# Patient Record
Sex: MALE | Race: WHITE
[De-identification: names, ages, dates, MRNs, and addresses within clinical notes are randomized per-mention and may not be internally consistent; named-entity substitution may affect disease eponyms.]

---

## 2017-01-18 ENCOUNTER — HOSPITAL ENCOUNTER (OUTPATIENT)
Dept: HOSPITAL 47 - LABWHC1 | Age: 67
Discharge: HOME | End: 2017-01-18
Payer: MEDICARE

## 2017-01-18 DIAGNOSIS — M46.20: Primary | ICD-10-CM

## 2017-01-18 LAB
ANION GAP SERPL CALC-SCNC: 13 MMOL/L
BASOPHILS # BLD AUTO: 0 K/UL (ref 0–0.2)
BASOPHILS NFR BLD AUTO: 1 %
BUN SERPL-SCNC: 17 MG/DL (ref 9–20)
CALCIUM SPEC-MCNC: 9.6 MG/DL (ref 8.4–10.2)
CH: 30.1
CHCM: 31.8
CHLORIDE SERPL-SCNC: 100 MMOL/L (ref 98–107)
CO2 SERPL-SCNC: 26 MMOL/L (ref 22–30)
EOSINOPHIL # BLD AUTO: 0.2 K/UL (ref 0–0.7)
EOSINOPHIL NFR BLD AUTO: 3 %
ERYTHROCYTE [DISTWIDTH] IN BLOOD BY AUTOMATED COUNT: 3.84 M/UL (ref 4.3–5.9)
ERYTHROCYTE [DISTWIDTH] IN BLOOD: 13.3 % (ref 11.5–15.5)
GLUCOSE SERPL-MCNC: 221 MG/DL (ref 74–99)
HCT VFR BLD AUTO: 36.5 % (ref 39–53)
HDW: 2.07
HGB BLD-MCNC: 11.9 GM/DL (ref 13–17.5)
LUC NFR BLD AUTO: 2 %
LYMPHOCYTES # SPEC AUTO: 2.1 K/UL (ref 1–4.8)
LYMPHOCYTES NFR SPEC AUTO: 39 %
MCH RBC QN AUTO: 30.8 PG (ref 25–35)
MCHC RBC AUTO-ENTMCNC: 32.5 G/DL (ref 31–37)
MCV RBC AUTO: 94.9 FL (ref 80–100)
MONOCYTES # BLD AUTO: 0.4 K/UL (ref 0–1)
MONOCYTES NFR BLD AUTO: 6 %
NEUTROPHILS # BLD AUTO: 2.6 K/UL (ref 1.3–7.7)
NEUTROPHILS NFR BLD AUTO: 49 %
NON-AFRICAN AMERICAN GFR(MDRD): >60
POTASSIUM SERPL-SCNC: 5.1 MMOL/L (ref 3.5–5.1)
SODIUM SERPL-SCNC: 139 MMOL/L (ref 137–145)
WBC # BLD AUTO: 0.11 10*3/UL
WBC # BLD AUTO: 5.4 K/UL (ref 3.8–10.6)
WBC (PEROX): 5.88

## 2017-01-18 PROCEDURE — 85025 COMPLETE CBC W/AUTO DIFF WBC: CPT

## 2017-01-18 PROCEDURE — 36415 COLL VENOUS BLD VENIPUNCTURE: CPT

## 2017-01-18 PROCEDURE — 80048 BASIC METABOLIC PNL TOTAL CA: CPT

## 2017-01-18 PROCEDURE — 86140 C-REACTIVE PROTEIN: CPT

## 2018-05-14 ENCOUNTER — HOSPITAL ENCOUNTER (OUTPATIENT)
Dept: HOSPITAL 47 - RADMRIMAIN | Age: 68
Discharge: HOME | End: 2018-05-14
Attending: FAMILY MEDICINE
Payer: MEDICARE

## 2018-05-14 DIAGNOSIS — M54.2: Primary | ICD-10-CM

## 2018-05-14 DIAGNOSIS — M40.209: ICD-10-CM

## 2018-05-14 PROCEDURE — 82565 ASSAY OF CREATININE: CPT

## 2018-06-18 ENCOUNTER — HOSPITAL ENCOUNTER (INPATIENT)
Dept: HOSPITAL 47 - EC | Age: 68
LOS: 3 days | Discharge: NURSING, REHAB OR LONG-TERM CARE | DRG: 193 | End: 2018-06-21
Attending: FAMILY MEDICINE | Admitting: FAMILY MEDICINE
Payer: MEDICARE

## 2018-06-18 VITALS — BODY MASS INDEX: 23.8 KG/M2

## 2018-06-18 DIAGNOSIS — J45.909: ICD-10-CM

## 2018-06-18 DIAGNOSIS — Z98.1: ICD-10-CM

## 2018-06-18 DIAGNOSIS — E11.9: ICD-10-CM

## 2018-06-18 DIAGNOSIS — Z79.899: ICD-10-CM

## 2018-06-18 DIAGNOSIS — I10: ICD-10-CM

## 2018-06-18 DIAGNOSIS — K82.8: ICD-10-CM

## 2018-06-18 DIAGNOSIS — Z79.4: ICD-10-CM

## 2018-06-18 DIAGNOSIS — R74.0: ICD-10-CM

## 2018-06-18 DIAGNOSIS — R79.1: ICD-10-CM

## 2018-06-18 DIAGNOSIS — M40.50: ICD-10-CM

## 2018-06-18 DIAGNOSIS — G93.40: ICD-10-CM

## 2018-06-18 DIAGNOSIS — J18.9: Primary | ICD-10-CM

## 2018-06-18 DIAGNOSIS — M19.90: ICD-10-CM

## 2018-06-18 DIAGNOSIS — Z87.891: ICD-10-CM

## 2018-06-18 DIAGNOSIS — Z79.51: ICD-10-CM
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
## 2018-06-18 LAB
ALBUMIN SERPL-MCNC: 3 G/DL (ref 3.5–5)
ALP SERPL-CCNC: 466 U/L (ref 38–126)
ALT SERPL-CCNC: 113 U/L (ref 21–72)
ANION GAP SERPL CALC-SCNC: 11 MMOL/L
APTT BLD: 21 SEC (ref 22–30)
AST SERPL-CCNC: 70 U/L (ref 17–59)
BASOPHILS # BLD AUTO: 0 K/UL (ref 0–0.2)
BASOPHILS NFR BLD AUTO: 0 %
BUN SERPL-SCNC: 33 MG/DL (ref 9–20)
CALCIUM SPEC-MCNC: 9.1 MG/DL (ref 8.4–10.2)
CHLORIDE SERPL-SCNC: 102 MMOL/L (ref 98–107)
CK SERPL-CCNC: 77 U/L (ref 55–170)
CO2 SERPL-SCNC: 25 MMOL/L (ref 22–30)
EOSINOPHIL # BLD AUTO: 0 K/UL (ref 0–0.7)
EOSINOPHIL NFR BLD AUTO: 0 %
ERYTHROCYTE [DISTWIDTH] IN BLOOD BY AUTOMATED COUNT: 3.47 M/UL (ref 4.3–5.9)
ERYTHROCYTE [DISTWIDTH] IN BLOOD: 13.7 % (ref 11.5–15.5)
GLUCOSE BLD-MCNC: 301 MG/DL (ref 75–99)
GLUCOSE SERPL-MCNC: 348 MG/DL (ref 74–99)
GLUCOSE UR QL: (no result)
HCT VFR BLD AUTO: 32.5 % (ref 39–53)
HGB BLD-MCNC: 10.6 GM/DL (ref 13–17.5)
INR PPP: 0.9 (ref ?–1.2)
LYMPHOCYTES # SPEC AUTO: 0.5 K/UL (ref 1–4.8)
LYMPHOCYTES NFR SPEC AUTO: 6 %
MCH RBC QN AUTO: 30.7 PG (ref 25–35)
MCHC RBC AUTO-ENTMCNC: 32.7 G/DL (ref 31–37)
MCV RBC AUTO: 93.8 FL (ref 80–100)
MONOCYTES # BLD AUTO: 0.4 K/UL (ref 0–1)
MONOCYTES NFR BLD AUTO: 5 %
NEUTROPHILS # BLD AUTO: 7.3 K/UL (ref 1.3–7.7)
NEUTROPHILS NFR BLD AUTO: 88 %
PH UR: 5.5 [PH] (ref 5–8)
PLATELET # BLD AUTO: 400 K/UL (ref 150–450)
POTASSIUM SERPL-SCNC: 4.8 MMOL/L (ref 3.5–5.1)
PROT SERPL-MCNC: 5.4 G/DL (ref 6.3–8.2)
PROT UR QL: (no result)
PT BLD: 9.3 SEC (ref 9–12)
RBC UR QL: 2 /HPF (ref 0–5)
SODIUM SERPL-SCNC: 138 MMOL/L (ref 137–145)
SP GR UR: 1.02 (ref 1–1.03)
TROPONIN I SERPL-MCNC: <0.012 NG/ML (ref 0–0.03)
UROBILINOGEN UR QL STRIP: <2 MG/DL (ref ?–2)
WBC # BLD AUTO: 8.3 K/UL (ref 3.8–10.6)
WBC #/AREA URNS HPF: 1 /HPF (ref 0–5)

## 2018-06-18 PROCEDURE — 83605 ASSAY OF LACTIC ACID: CPT

## 2018-06-18 PROCEDURE — 36415 COLL VENOUS BLD VENIPUNCTURE: CPT

## 2018-06-18 PROCEDURE — 96365 THER/PROPH/DIAG IV INF INIT: CPT

## 2018-06-18 PROCEDURE — 83036 HEMOGLOBIN GLYCOSYLATED A1C: CPT

## 2018-06-18 PROCEDURE — 81001 URINALYSIS AUTO W/SCOPE: CPT

## 2018-06-18 PROCEDURE — 84484 ASSAY OF TROPONIN QUANT: CPT

## 2018-06-18 PROCEDURE — 94760 N-INVAS EAR/PLS OXIMETRY 1: CPT

## 2018-06-18 PROCEDURE — 87086 URINE CULTURE/COLONY COUNT: CPT

## 2018-06-18 PROCEDURE — 96368 THER/DIAG CONCURRENT INF: CPT

## 2018-06-18 PROCEDURE — 85610 PROTHROMBIN TIME: CPT

## 2018-06-18 PROCEDURE — 76705 ECHO EXAM OF ABDOMEN: CPT

## 2018-06-18 PROCEDURE — 85730 THROMBOPLASTIN TIME PARTIAL: CPT

## 2018-06-18 PROCEDURE — 85025 COMPLETE CBC W/AUTO DIFF WBC: CPT

## 2018-06-18 PROCEDURE — 71275 CT ANGIOGRAPHY CHEST: CPT

## 2018-06-18 PROCEDURE — 80202 ASSAY OF VANCOMYCIN: CPT

## 2018-06-18 PROCEDURE — 85027 COMPLETE CBC AUTOMATED: CPT

## 2018-06-18 PROCEDURE — 93005 ELECTROCARDIOGRAM TRACING: CPT

## 2018-06-18 PROCEDURE — 70450 CT HEAD/BRAIN W/O DYE: CPT

## 2018-06-18 PROCEDURE — 80048 BASIC METABOLIC PNL TOTAL CA: CPT

## 2018-06-18 PROCEDURE — 93970 EXTREMITY STUDY: CPT

## 2018-06-18 PROCEDURE — 80053 COMPREHEN METABOLIC PANEL: CPT

## 2018-06-18 PROCEDURE — 85379 FIBRIN DEGRADATION QUANT: CPT

## 2018-06-18 PROCEDURE — 80076 HEPATIC FUNCTION PANEL: CPT

## 2018-06-18 PROCEDURE — 94640 AIRWAY INHALATION TREATMENT: CPT

## 2018-06-18 PROCEDURE — 71045 X-RAY EXAM CHEST 1 VIEW: CPT

## 2018-06-18 PROCEDURE — 82553 CREATINE MB FRACTION: CPT

## 2018-06-18 PROCEDURE — 82550 ASSAY OF CK (CPK): CPT

## 2018-06-18 PROCEDURE — 99285 EMERGENCY DEPT VISIT HI MDM: CPT

## 2018-06-18 PROCEDURE — 87040 BLOOD CULTURE FOR BACTERIA: CPT

## 2018-06-18 RX ADMIN — GABAPENTIN SCH MG: 300 CAPSULE ORAL at 22:51

## 2018-06-18 RX ADMIN — NICARDIPINE HYDROCHLORIDE SCH MLS/HR: 2.5 INJECTION INTRAVENOUS at 20:59

## 2018-06-18 RX ADMIN — MEGESTROL ACETATE SCH MG: 40 SUSPENSION ORAL at 22:51

## 2018-06-18 RX ADMIN — NICARDIPINE HYDROCHLORIDE SCH MLS/HR: 2.5 INJECTION INTRAVENOUS at 19:47

## 2018-06-18 RX ADMIN — METOCLOPRAMIDE SCH MG: 10 TABLET ORAL at 22:51

## 2018-06-18 NOTE — XR
EXAMINATION TYPE: XR chest 1V portable

 

DATE OF EXAM: 6/18/2018

 

COMPARISON: 12/30/2014

 

HISTORY: Fever

 

TECHNIQUE: Single frontal view of the chest is obtained.

 

FINDINGS:  Heart size is normal. There is no heart failure. There is old posterior right side healed 
rib fractures. There is a minimal infiltrate in the lateral left lower lobe. There is no pleural effu
tawana. Thoracic aorta is atheromatous.

 

IMPRESSION:  There is new mild infiltrate in the lateral left lower lobe compared to old exam. Normal
 heart.

## 2018-06-18 NOTE — US
EXAMINATION TYPE: US abdomen limited

 

DATE OF EXAM: 6/18/2018

 

COMPARISON: NONE

 

CLINICAL HISTORY: Transaminitis. Abnormal labs. 

 

EXAM MEASUREMENTS:

 

Liver Length:  15.2 cm   

Gallbladder Wall:  0.2 cm   

CBD:  0.8 cm

CHD: 1.0 cm

Right Kidney:  10.9 x 4.8 x 4.1 cm

 

 

 

Pancreas:  Echogenic.  Main pancreatic duct = 2.6 mm. Tail obscured by overlying bowel gas

Liver:  Limited exam due to patient position.  Scanned through ribs.  Portions seen appear within nor
mal limits.    

Gallbladder:  Appears enlarged.  Fold seen.  Fundal not entirely visualized due to bowel gas. 

**Evidence for sonographic Flores's sign:  neg

CBD:  Dilated

CHD: Dilated 

Right Kidney:  wnl as visualized 

 

****Limited exam due to overlying bowel gas, patient unable to lay down due to spine and turn LLD. 

 

IMPRESSION: Gallbladder is dilated and measures 13 x 5 cm. This is consistent with cholecystitis. Int
rahepatic bile ducts do not appear dilated.

## 2018-06-18 NOTE — CT
EXAMINATION TYPE: CT angio chest

 

DATE OF EXAM: 6/18/2018 8:19 PM

 

COMPARISON: NONE

 

HISTORY: Elevated D-dimer

 

CT DLP: 316 mGycm

Automated exposure control for dose reduction was used.

 

CONTRAST: 

CTA scan of the thorax is performed with IV Contrast, patient injected with 70ml mL of Isovue 370, pu
lmonary embolism protocol.  There are 3-D post processed images..  

 

FINDINGS:

 

There is some patchy interstitial infiltrate in the left upper lobe and right upper lobe. This is wor
se on the left side. There is some patchy consolidation in the lingula left upper lobe. There is mirella
lar more mild interstitial groundglass infiltrate in the lower lobes. There is no pleural effusion. H
eart size is normal. There is no pericardial effusion.

 

I see no filling defects in the pulmonary arteries. There are no hilar masses. There is no evidence o
f thoracic aortic aneurysm or dissection. There is multilevel lower thoracic posterior spine fusion s
urgery. I see no bony destructive process. There is old appearing compression fracture of L1.

 

 

IMPRESSION: 

NO EVIDENCE OF PULMONARY EMBOLISM.

 

BILATERAL INTERSTITIAL GROUNDGLASS INFILTRATES CONSISTENT WITH PULMONARY FIBROSIS. THERE IS AIRSPACE 
INFILTRATE IN THE LEFT UPPER LOBE MAINLY IN THE LINGULA CONSISTENT WITH BRONCHOPNEUMONIA.

## 2018-06-18 NOTE — ED
Altered Mental Status HPI





- General


Chief Complaint: Altered Mental Status


Stated Complaint: abn labs


Time Seen by Provider: 06/18/18 17:55


Source: patient, EMS


Mode of arrival: EMS


Limitations: no limitations





- History of Present Illness


Initial Comments: 





This is a 68-year-old male who presents emergency department for mental status 

changes over the last day or so.  The family states is been hallucinating and 

having some confused speech.  He is also noted to have a fever today as high as 

101 at the nursing facility.  The patient was recently released from the 

hospital after having a neck surgery.  He was using an incentive spirometer at 

the nursing facility however then stopped using it.  He has developed a little 

bit of a cough however he states this is chronic from his asthma.  The patient 

denies any chest pain.  No nausea, vomiting, or diarrhea.  No abdominal pain.  

Family states that he had blood work performed at the nursing facility that 

showed an elevated d-dimer and thus he was sent to the emergency department for 

further evaluation.  No other acute complaints.





- Related Data


 Home Medications











 Medication  Instructions  Recorded  Confirmed


 


Gabapentin [Neurontin] 300 mg PO TID 10/05/14 06/18/18


 


Omeprazole [PriLOSEC] 20 mg PO AC-BRKFST PRN 10/05/14 06/18/18


 


metFORMIN HCL [Glucophage] 1,000 mg PO AC-BID 10/05/14 06/18/18


 


Amoxic-Pot Clav 875-125Mg 1 tab PO Q12HR 06/18/18 06/18/18





[Augmentin 875-125]   


 


Atorvastatin [Lipitor] 40 mg PO HS 06/18/18 06/18/18


 


Bisacodyl [Dulcolax] 10 mg RECTAL DAILY PRN 06/18/18 06/18/18


 


Budesonide/Formoterol Fumarate 2 puff INHALATION BID 06/18/18 06/18/18





[Symbicort 160-4.5 Mcg Inhaler]   


 


Fluticasone/Vilanterol [Breo 1 inhalation PO Q24HR 06/18/18 06/18/18





Ellipta 100-25 Mcg Inhaler]   


 


Hydrochlorothiazide [Hydrodiuril] 12.5 mg PO DAILY 06/18/18 06/18/18


 


Insulin Aspart [NovoLOG See Protocol SQ AC-TID 06/18/18 06/18/18





(formulary)]   


 


Insulin Detemir [Levemir] 15 unit SQ DAILY 06/18/18 06/18/18


 


Ipratropium Nebulized [Atrovent 0.5 mg INHALATION RT-TID 06/18/18 06/18/18





Nebulized]   


 


Lactulose 20 gm PO BID PRN 06/18/18 06/18/18


 


Magnesium Hydroxide [Milk of 7,200 mg PO AS DIRECTED PRN 06/18/18 06/18/18





Magnesia Concentrate]   


 


Megestrol Acetate [Megace] 200 mg PO TID 06/18/18 06/18/18


 


Methocarbamol [Robaxin] 500 mg PO Q8H PRN 06/18/18 06/18/18


 


Metoclopramide HCl [Reglan] 10 mg PO QID 06/18/18 06/18/18


 


Metoprolol Tartrate [Lopressor] 25 mg PO BID 06/18/18 06/18/18


 


Montelukast [Singulair] 10 mg PO DAILY 06/18/18 06/18/18


 


Na Phos,M-B/Na Phos,Di-Ba [Fleet 133 ml RECTAL ONCE PRN 06/18/18 06/18/18





Adult]   


 


Vits A and D/White Pet/Lanolin [A 1 applic TOPICAL DAILY PRN 06/18/18 06/18/18





and D Ointment]   


 


oxyCODONE HCL [oxyCODONE HCL ER] 40 mg PO Q12HR 06/18/18 06/18/18


 


oxyCODONE-APAP 5-325MG [Percocet 2 tab PO Q4HR PRN 06/18/18 06/18/18





5-325 mg]   











 Allergies











Allergy/AdvReac Type Severity Reaction Status Date / Time


 


diazepam [From Valium] Allergy  Hallucinati Verified 06/18/18 18:28





   ons  


 


morphine Allergy  Rapid Verified 06/18/18 18:28





   Heart Rate  














Review of Systems


ROS Statement: 


Those systems with pertinent positive or pertinent negative responses have been 

documented in the HPI.





ROS Other: All systems not noted in ROS Statement are negative.





Past Medical History


Past Medical History: Asthma, Diabetes Mellitus, Hypertension


Additional Past Medical History / Comment(s): back problems, osteomyelitis in 

his back 2014


History of Any Multi-Drug Resistant Organisms: None Reported


Past Surgical History: Orthopedic Surgery, Tonsillectomy


Additional Past Surgical History / Comment(s): multiple back surgeries, 

cervical fusion, lumbar repair


Past Anesthesia/Blood Transfusion Reactions: No Reported Reaction


Past Psychological History: No Psychological Hx Reported


Smoking Status: Former smoker


Past Alcohol Use History: None Reported


Past Drug Use History: None Reported





General Exam





- General Exam Comments


Initial Comments: 





Constitutional: Awake alert Appears comfortable


Head: Normocephalic atraumatic 


Eyes: no conjunctival injection No scleral icterus EOMI


Neck: No JVD Supple, aspen collar in place


Heart: Regular rate rhythm normal S1-S2 no murmurs


Lungs: Clear to auscultation bilaterally No wheezing No rales, decreased breath 

sounds on the right side


Abdomen: Soft nondistended nontender


Extremities: Non edematous DP pulses intact Radial pulses intact


Neuro: Awake and alert.  Oriented 2.  Does not know the year or the season No 

focal neurologic deficits


Psych: Appropriate mood and affect





Limitations: no limitations





Course


 Vital Signs











  06/18/18 06/18/18 06/18/18





  17:46 19:51 21:03


 


Temperature 99.9 F H  97.9 F


 


Pulse Rate 84 84 73


 


Respiratory 20 18 18





Rate   


 


Blood Pressure 118/55 148/70 150/73


 


O2 Sat by Pulse 95 98 97





Oximetry   














- Reevaluation(s)


Reevaluation #1: 





06/18/18 20:47


EKG showing normal sinus rhythm with a rate of 75.  There is no known last 7 

changes or T-wave inversions.  QTC is 406.  Other intervals normal.  No ectopy.





Medical Decision Making





- Medical Decision Making





Is a 68-year-old male who presents emergency department for mental status 

changes.  He is found to have a left-sided pneumonia.  Started on vancomycin 

and cefepime to cover for H CAPD.  The patient was awake and alert throughout 

the entire emergency department stay.  Vital signs were stable.  The patient 

also was noted to have some transaminitis and elevation in his alk phos.  The 

patient had an ultrasound performed that showed gallbladder wall dilation that 

could be consistent with cholecystitis.  The patient is absolutely no abdominal 

pain.  I spoke with Dr. Manning about this and he stated that it could be chronic 

however he does not feel that this is anything acute in nature that it does not 

require any emergent intervention.  I spoke with Dr. Olivo who accepts the 

patient for admission.  Dr. Manning was placed on consult.





- Lab Data


Result diagrams: 


 06/18/18 18:15





 06/18/18 18:15


 Lab Results











  06/18/18 06/18/18 06/18/18 Range/Units





  18:15 18:15 18:15 


 


WBC   8.3   (3.8-10.6)  k/uL


 


RBC   3.47 L   (4.30-5.90)  m/uL


 


Hgb   10.6 L   (13.0-17.5)  gm/dL


 


Hct   32.5 L   (39.0-53.0)  %


 


MCV   93.8   (80.0-100.0)  fL


 


MCH   30.7   (25.0-35.0)  pg


 


MCHC   32.7   (31.0-37.0)  g/dL


 


RDW   13.7   (11.5-15.5)  %


 


Plt Count   400   (150-450)  k/uL


 


Neutrophils %   88   %


 


Lymphocytes %   6   %


 


Monocytes %   5   %


 


Eosinophils %   0   %


 


Basophils %   0   %


 


Neutrophils #   7.3   (1.3-7.7)  k/uL


 


Lymphocytes #   0.5 L   (1.0-4.8)  k/uL


 


Monocytes #   0.4   (0-1.0)  k/uL


 


Eosinophils #   0.0   (0-0.7)  k/uL


 


Basophils #   0.0   (0-0.2)  k/uL


 


PT     (9.0-12.0)  sec


 


INR     (<1.2)  


 


APTT     (22.0-30.0)  sec


 


Sodium    138  (137-145)  mmol/L


 


Potassium    4.8  (3.5-5.1)  mmol/L


 


Chloride    102  ()  mmol/L


 


Carbon Dioxide    25  (22-30)  mmol/L


 


Anion Gap    11  mmol/L


 


BUN    33 H  (9-20)  mg/dL


 


Creatinine    0.70  (0.66-1.25)  mg/dL


 


Est GFR (CKD-EPI)AfAm    >90  (>60 ml/min/1.73 sqM)  


 


Est GFR (CKD-EPI)NonAf    >90  (>60 ml/min/1.73 sqM)  


 


Glucose    348 H  (74-99)  mg/dL


 


Plasma Lactic Acid Martín     (0.7-2.0)  mmol/L


 


Calcium    9.1  (8.4-10.2)  mg/dL


 


Total Bilirubin    0.2  (0.2-1.3)  mg/dL


 


AST    70 H  (17-59)  U/L


 


ALT    113 H  (21-72)  U/L


 


Alkaline Phosphatase    466 H  ()  U/L


 


Total Creatine Kinase  77    ()  U/L


 


CK-MB (CK-2)  1.7    (0.0-2.4)  ng/mL


 


CK-MB (CK-2) Rel Index  2.2    


 


Troponin I  <0.012    (0.000-0.034)  ng/mL


 


Total Protein    5.4 L  (6.3-8.2)  g/dL


 


Albumin    3.0 L  (3.5-5.0)  g/dL


 


Urine Color     


 


Urine Appearance     (Clear)  


 


Urine pH     (5.0-8.0)  


 


Ur Specific Gravity     (1.001-1.035)  


 


Urine Protein     (Negative)  


 


Urine Glucose (UA)     (Negative)  


 


Urine Ketones     (Negative)  


 


Urine Blood     (Negative)  


 


Urine Nitrite     (Negative)  


 


Urine Bilirubin     (Negative)  


 


Urine Urobilinogen     (<2.0)  mg/dL


 


Ur Leukocyte Esterase     (Negative)  


 


Urine RBC     (0-5)  /hpf


 


Urine WBC     (0-5)  /hpf


 


Urine Mucus     (None)  /hpf














  06/18/18 06/18/18 06/18/18 Range/Units





  18:15 18:15 19:35 


 


WBC     (3.8-10.6)  k/uL


 


RBC     (4.30-5.90)  m/uL


 


Hgb     (13.0-17.5)  gm/dL


 


Hct     (39.0-53.0)  %


 


MCV     (80.0-100.0)  fL


 


MCH     (25.0-35.0)  pg


 


MCHC     (31.0-37.0)  g/dL


 


RDW     (11.5-15.5)  %


 


Plt Count     (150-450)  k/uL


 


Neutrophils %     %


 


Lymphocytes %     %


 


Monocytes %     %


 


Eosinophils %     %


 


Basophils %     %


 


Neutrophils #     (1.3-7.7)  k/uL


 


Lymphocytes #     (1.0-4.8)  k/uL


 


Monocytes #     (0-1.0)  k/uL


 


Eosinophils #     (0-0.7)  k/uL


 


Basophils #     (0-0.2)  k/uL


 


PT   9.3   (9.0-12.0)  sec


 


INR   0.9   (<1.2)  


 


APTT   21.0 L   (22.0-30.0)  sec


 


Sodium     (137-145)  mmol/L


 


Potassium     (3.5-5.1)  mmol/L


 


Chloride     ()  mmol/L


 


Carbon Dioxide     (22-30)  mmol/L


 


Anion Gap     mmol/L


 


BUN     (9-20)  mg/dL


 


Creatinine     (0.66-1.25)  mg/dL


 


Est GFR (CKD-EPI)AfAm     (>60 ml/min/1.73 sqM)  


 


Est GFR (CKD-EPI)NonAf     (>60 ml/min/1.73 sqM)  


 


Glucose     (74-99)  mg/dL


 


Plasma Lactic Acid Martín  1.8    (0.7-2.0)  mmol/L


 


Calcium     (8.4-10.2)  mg/dL


 


Total Bilirubin     (0.2-1.3)  mg/dL


 


AST     (17-59)  U/L


 


ALT     (21-72)  U/L


 


Alkaline Phosphatase     ()  U/L


 


Total Creatine Kinase     ()  U/L


 


CK-MB (CK-2)     (0.0-2.4)  ng/mL


 


CK-MB (CK-2) Rel Index     


 


Troponin I     (0.000-0.034)  ng/mL


 


Total Protein     (6.3-8.2)  g/dL


 


Albumin     (3.5-5.0)  g/dL


 


Urine Color    Yellow  


 


Urine Appearance    Clear  (Clear)  


 


Urine pH    5.5  (5.0-8.0)  


 


Ur Specific Gravity    1.021  (1.001-1.035)  


 


Urine Protein    1+ H  (Negative)  


 


Urine Glucose (UA)    4+ H  (Negative)  


 


Urine Ketones    Negative  (Negative)  


 


Urine Blood    Negative  (Negative)  


 


Urine Nitrite    Negative  (Negative)  


 


Urine Bilirubin    Negative  (Negative)  


 


Urine Urobilinogen    <2.0  (<2.0)  mg/dL


 


Ur Leukocyte Esterase    Negative  (Negative)  


 


Urine RBC    2  (0-5)  /hpf


 


Urine WBC    1  (0-5)  /hpf


 


Urine Mucus    Rare H  (None)  /hpf














Disposition


Clinical Impression: 


 HCAP (healthcare-associated pneumonia), Encephalopathy, Transaminitis, 

Gallbladder dilatation





Disposition: ADMITTED AS IP TO THIS [facility]


Condition: Stable

## 2018-06-18 NOTE — CT
EXAMINATION TYPE: CT brain wo con

 

DATE OF EXAM: 6/18/2018

 

COMPARISON: NONE

 

HISTORY: Patient poor historian mental status changes

 

CT DLP: 1152.4 mGycm

Automated exposure control for dose reduction was used.

 

FINDINGS: 

There is some cerebral cortical atrophy. There is no mass effect nor midline shift. There is no sign 
of intracranial hemorrhage. The calvarium is intact. There is mild mucosal thickening in the ethmoid 
air cells.

 

IMPRESSION: 

MILD ATROPHY. NO ACUTE INTRACRANIAL ABNORMALITY.

## 2018-06-19 LAB
ALBUMIN SERPL-MCNC: 2.9 G/DL (ref 3.5–5)
ALP SERPL-CCNC: 393 U/L (ref 38–126)
ALT SERPL-CCNC: 93 U/L (ref 21–72)
ANION GAP SERPL CALC-SCNC: 10 MMOL/L
AST SERPL-CCNC: 55 U/L (ref 17–59)
BASOPHILS # BLD AUTO: 0 K/UL (ref 0–0.2)
BASOPHILS NFR BLD AUTO: 0 %
BUN SERPL-SCNC: 23 MG/DL (ref 9–20)
CALCIUM SPEC-MCNC: 9 MG/DL (ref 8.4–10.2)
CHLORIDE SERPL-SCNC: 101 MMOL/L (ref 98–107)
CO2 SERPL-SCNC: 26 MMOL/L (ref 22–30)
EOSINOPHIL # BLD AUTO: 0.1 K/UL (ref 0–0.7)
EOSINOPHIL NFR BLD AUTO: 1 %
ERYTHROCYTE [DISTWIDTH] IN BLOOD BY AUTOMATED COUNT: 3.5 M/UL (ref 4.3–5.9)
ERYTHROCYTE [DISTWIDTH] IN BLOOD: 13.5 % (ref 11.5–15.5)
GLUCOSE BLD-MCNC: 199 MG/DL (ref 75–99)
GLUCOSE BLD-MCNC: 222 MG/DL (ref 75–99)
GLUCOSE BLD-MCNC: 282 MG/DL (ref 75–99)
GLUCOSE BLD-MCNC: 285 MG/DL (ref 75–99)
GLUCOSE SERPL-MCNC: 207 MG/DL (ref 74–99)
HBA1C MFR BLD: 9.3 % (ref 4–6)
HCT VFR BLD AUTO: 32.4 % (ref 39–53)
HGB BLD-MCNC: 10.8 GM/DL (ref 13–17.5)
LYMPHOCYTES # SPEC AUTO: 1.7 K/UL (ref 1–4.8)
LYMPHOCYTES NFR SPEC AUTO: 14 %
MCH RBC QN AUTO: 30.9 PG (ref 25–35)
MCHC RBC AUTO-ENTMCNC: 33.3 G/DL (ref 31–37)
MCV RBC AUTO: 92.7 FL (ref 80–100)
MONOCYTES # BLD AUTO: 0.9 K/UL (ref 0–1)
MONOCYTES NFR BLD AUTO: 7 %
NEUTROPHILS # BLD AUTO: 8.8 K/UL (ref 1.3–7.7)
NEUTROPHILS NFR BLD AUTO: 76 %
PLATELET # BLD AUTO: 430 K/UL (ref 150–450)
POTASSIUM SERPL-SCNC: 4.9 MMOL/L (ref 3.5–5.1)
PROT SERPL-MCNC: 5.3 G/DL (ref 6.3–8.2)
SODIUM SERPL-SCNC: 137 MMOL/L (ref 137–145)
WBC # BLD AUTO: 11.7 K/UL (ref 3.8–10.6)

## 2018-06-19 RX ADMIN — MEGESTROL ACETATE SCH MG: 40 SUSPENSION ORAL at 16:27

## 2018-06-19 RX ADMIN — ATORVASTATIN CALCIUM SCH MG: 40 TABLET, FILM COATED ORAL at 21:00

## 2018-06-19 RX ADMIN — MONTELUKAST SODIUM SCH MG: 10 TABLET, FILM COATED ORAL at 09:38

## 2018-06-19 RX ADMIN — SODIUM CHLORIDE SCH MLS/HR: 9 INJECTION, SOLUTION INTRAVENOUS at 21:00

## 2018-06-19 RX ADMIN — MEGESTROL ACETATE SCH MG: 40 SUSPENSION ORAL at 09:12

## 2018-06-19 RX ADMIN — MEGESTROL ACETATE SCH MG: 40 SUSPENSION ORAL at 21:02

## 2018-06-19 RX ADMIN — OXYCODONE HYDROCHLORIDE SCH MG: 20 TABLET, FILM COATED, EXTENDED RELEASE ORAL at 21:00

## 2018-06-19 RX ADMIN — METOPROLOL TARTRATE SCH MG: 25 TABLET, FILM COATED ORAL at 21:01

## 2018-06-19 RX ADMIN — OXYCODONE HYDROCHLORIDE SCH MG: 20 TABLET, FILM COATED, EXTENDED RELEASE ORAL at 09:33

## 2018-06-19 RX ADMIN — HYDROCHLOROTHIAZIDE SCH MG: 12.5 CAPSULE ORAL at 09:10

## 2018-06-19 RX ADMIN — GABAPENTIN SCH MG: 300 CAPSULE ORAL at 09:09

## 2018-06-19 RX ADMIN — IPRATROPIUM BROMIDE SCH MG: 0.5 SOLUTION RESPIRATORY (INHALATION) at 13:01

## 2018-06-19 RX ADMIN — METOPROLOL TARTRATE SCH MG: 25 TABLET, FILM COATED ORAL at 09:37

## 2018-06-19 RX ADMIN — METOCLOPRAMIDE SCH MG: 10 TABLET ORAL at 13:31

## 2018-06-19 RX ADMIN — BUDESONIDE AND FORMOTEROL FUMARATE DIHYDRATE SCH PUFF: 160; 4.5 AEROSOL RESPIRATORY (INHALATION) at 07:10

## 2018-06-19 RX ADMIN — GABAPENTIN SCH MG: 300 CAPSULE ORAL at 21:00

## 2018-06-19 RX ADMIN — METOCLOPRAMIDE SCH MG: 10 TABLET ORAL at 09:13

## 2018-06-19 RX ADMIN — IPRATROPIUM BROMIDE SCH MG: 0.5 SOLUTION RESPIRATORY (INHALATION) at 19:34

## 2018-06-19 RX ADMIN — BUDESONIDE AND FORMOTEROL FUMARATE DIHYDRATE SCH PUFF: 160; 4.5 AEROSOL RESPIRATORY (INHALATION) at 19:35

## 2018-06-19 RX ADMIN — METOCLOPRAMIDE SCH MG: 10 TABLET ORAL at 21:00

## 2018-06-19 RX ADMIN — INSULIN DETEMIR SCH UNIT: 100 INJECTION, SOLUTION SUBCUTANEOUS at 09:22

## 2018-06-19 RX ADMIN — GABAPENTIN SCH MG: 300 CAPSULE ORAL at 16:28

## 2018-06-19 RX ADMIN — OXYCODONE AND ACETAMINOPHEN PRN EACH: 5; 325 TABLET ORAL at 06:11

## 2018-06-19 RX ADMIN — IPRATROPIUM BROMIDE SCH MG: 0.5 SOLUTION RESPIRATORY (INHALATION) at 07:10

## 2018-06-19 RX ADMIN — SODIUM CHLORIDE SCH MLS/HR: 9 INJECTION, SOLUTION INTRAVENOUS at 10:53

## 2018-06-19 RX ADMIN — OXYCODONE AND ACETAMINOPHEN PRN EACH: 5; 325 TABLET ORAL at 22:49

## 2018-06-19 RX ADMIN — METOCLOPRAMIDE SCH MG: 10 TABLET ORAL at 16:28

## 2018-06-19 NOTE — P.GSCN
History of Present Illness


Consult date: 06/19/18


History of present illness: 





68-year-old male presented to the emergency department with some altered mental 

status. He is noted to have a recent cervical neck surgery at the [facility] and is currently in an [place] collar. He lives in a nursing facility 

and was found to have altered mental status and presented to the emergency 

department. On workup in the emergency department, the patient was found to 

have a pneumonia that is currently being treated. He also was found to have 

some transaminitis and secondary to this, the emergency department did order an 

abdominal ultrasound for evaluation of the gallbladder. The gallbladder was 

noted to be distended however with no stones and no positive Flores sign. The 

patient currently is eating lunch and is tolerating that without any abdominal 

pain. The patient denies ever having any abdominal pain. He denies any nausea 

and vomiting. He denies any change in bowel function. He is more alert today 

than he was on presentation per nursing. The patient has not been febrile. He 

currently denies any chest pain or shortness of breath.





Review of Systems


All systems: negative





Past Medical History


Past Medical History: Asthma, Diabetes Mellitus, Hypertension


Additional Past Medical History / Comment(s): back problems, osteomyelitis in 

his back 2014


History of Any Multi-Drug Resistant Organisms: None Reported


Past Surgical History: Orthopedic Surgery, Tonsillectomy


Additional Past Surgical History / Comment(s): multiple back surgeries, 

cervical fusion, lumbar repair


Past Anesthesia/Blood Transfusion Reactions: No Reported Reaction


Past Psychological History: No Psychological Hx Reported


Smoking Status: Former smoker


Past Alcohol Use History: None Reported


Past Drug Use History: None Reported





Medications and Allergies


 Home Medications











 Medication  Instructions  Recorded  Confirmed  Type


 


Gabapentin [Neurontin] 300 mg PO TID 10/05/14 06/18/18 History


 


Omeprazole [PriLOSEC] 20 mg PO AC-BRKFST PRN 10/05/14 06/18/18 History


 


metFORMIN HCL [Glucophage] 1,000 mg PO AC-BID 10/05/14 06/18/18 History


 


Amoxic-Pot Clav 875-125Mg 1 tab PO Q12HR 06/18/18 06/18/18 History





[Augmentin 875-125]    


 


Atorvastatin [Lipitor] 40 mg PO HS 06/18/18 06/18/18 History


 


Bisacodyl [Dulcolax] 10 mg RECTAL DAILY PRN 06/18/18 06/18/18 History


 


Budesonide/Formoterol Fumarate 2 puff INHALATION BID 06/18/18 06/18/18 History





[Symbicort 160-4.5 Mcg Inhaler]    


 


Fluticasone/Vilanterol [Breo 1 inhalation PO Q24HR 06/18/18 06/18/18 History





Ellipta 100-25 Mcg Inhaler]    


 


Hydrochlorothiazide [Hydrodiuril] 12.5 mg PO DAILY 06/18/18 06/18/18 History


 


Insulin Aspart [NovoLOG See Protocol SQ AC-TID 06/18/18 06/18/18 History





(formulary)]    


 


Insulin Detemir [Levemir] 15 unit SQ DAILY 06/18/18 06/18/18 History


 


Ipratropium Nebulized [Atrovent 0.5 mg INHALATION RT-TID 06/18/18 06/18/18 

History





Nebulized]    


 


Lactulose 20 gm PO BID PRN 06/18/18 06/18/18 History


 


Magnesium Hydroxide [Milk of 7,200 mg PO AS DIRECTED PRN 06/18/18 06/18/18 

History





Magnesia Concentrate]    


 


Megestrol Acetate [Megace] 200 mg PO TID 06/18/18 06/18/18 History


 


Methocarbamol [Robaxin] 500 mg PO Q8H PRN 06/18/18 06/18/18 History


 


Metoclopramide HCl [Reglan] 10 mg PO QID 06/18/18 06/18/18 History


 


Metoprolol Tartrate [Lopressor] 25 mg PO BID 06/18/18 06/18/18 History


 


Montelukast [Singulair] 10 mg PO DAILY 06/18/18 06/18/18 History


 


Na Phos,M-B/Na Phos,Di-Ba [Fleet 133 ml RECTAL ONCE PRN 06/18/18 06/18/18 

History





Adult]    


 


Vits A and D/White Pet/Lanolin [A 1 applic TOPICAL DAILY PRN 06/18/18 06/18/18 

History





and D Ointment]    


 


oxyCODONE HCL [oxyCODONE HCL ER] 40 mg PO Q12HR 06/18/18 06/18/18 History


 


oxyCODONE-APAP 5-325MG [Percocet 2 tab PO Q4HR PRN 06/18/18 06/18/18 History





5-325 mg]    











 Allergies











Allergy/AdvReac Type Severity Reaction Status Date / Time


 


diazepam [From Valium] Allergy  Hallucinati Verified 06/18/18 18:28





   ons  


 


morphine Allergy  Rapid Verified 06/18/18 18:28





   Heart Rate  














Surgical - Exam


Osteopathic Statement: *.  No significant issues noted on an osteopathic 

structural exam other than those noted in the History and Physical/Consult.


 Vital Signs











Temp Pulse Resp BP Pulse Ox


 


 99.9 F H  84   20   118/55   95 


 


 06/18/18 17:46  06/18/18 17:46  06/18/18 17:46  06/18/18 17:46  06/18/18 17:46














- General


well nourished, no distress





- ENT


normal mucosa, no hearing loss





- Neck


trachea midline





- Respiratory





No difficulty with respiration





- Abdomen





Soft, nontender, nondistended, no rebound, no guarding





- Neurologic


normal sensation





Results





- Labs





 06/18/18 18:15





 06/18/18 18:15


 Abnormal Lab Results - Last 24 Hours (Table)











  06/18/18 06/18/18 06/18/18 Range/Units





  18:15 18:15 18:15 


 


RBC  3.47 L    (4.30-5.90)  m/uL


 


Hgb  10.6 L    (13.0-17.5)  gm/dL


 


Hct  32.5 L    (39.0-53.0)  %


 


Lymphocytes #  0.5 L    (1.0-4.8)  k/uL


 


APTT    21.0 L  (22.0-30.0)  sec


 


BUN   33 H   (9-20)  mg/dL


 


Glucose   348 H   (74-99)  mg/dL


 


POC Glucose (mg/dL)     (75-99)  mg/dL


 


AST   70 H   (17-59)  U/L


 


ALT   113 H   (21-72)  U/L


 


Alkaline Phosphatase   466 H   ()  U/L


 


Total Protein   5.4 L   (6.3-8.2)  g/dL


 


Albumin   3.0 L   (3.5-5.0)  g/dL


 


Urine Protein     (Negative)  


 


Urine Glucose (UA)     (Negative)  


 


Urine Mucus     (None)  /hpf














  06/18/18 06/18/18 06/19/18 Range/Units





  19:35 22:31 06:47 


 


RBC     (4.30-5.90)  m/uL


 


Hgb     (13.0-17.5)  gm/dL


 


Hct     (39.0-53.0)  %


 


Lymphocytes #     (1.0-4.8)  k/uL


 


APTT     (22.0-30.0)  sec


 


BUN     (9-20)  mg/dL


 


Glucose     (74-99)  mg/dL


 


POC Glucose (mg/dL)   301 H  282 H  (75-99)  mg/dL


 


AST     (17-59)  U/L


 


ALT     (21-72)  U/L


 


Alkaline Phosphatase     ()  U/L


 


Total Protein     (6.3-8.2)  g/dL


 


Albumin     (3.5-5.0)  g/dL


 


Urine Protein  1+ H    (Negative)  


 


Urine Glucose (UA)  4+ H    (Negative)  


 


Urine Mucus  Rare H    (None)  /hpf














  06/19/18 Range/Units





  11:02 


 


RBC   (4.30-5.90)  m/uL


 


Hgb   (13.0-17.5)  gm/dL


 


Hct   (39.0-53.0)  %


 


Lymphocytes #   (1.0-4.8)  k/uL


 


APTT   (22.0-30.0)  sec


 


BUN   (9-20)  mg/dL


 


Glucose   (74-99)  mg/dL


 


POC Glucose (mg/dL)  285 H  (75-99)  mg/dL


 


AST   (17-59)  U/L


 


ALT   (21-72)  U/L


 


Alkaline Phosphatase   ()  U/L


 


Total Protein   (6.3-8.2)  g/dL


 


Albumin   (3.5-5.0)  g/dL


 


Urine Protein   (Negative)  


 


Urine Glucose (UA)   (Negative)  


 


Urine Mucus   (None)  /hpf








 Microbiology - Last 24 Hours (Table)











 06/18/18 19:35 Urine Culture - Preliminary





 Urine,Voided 








 Diabetes panel











  06/18/18 Range/Units





  18:15 


 


Sodium  138  (137-145)  mmol/L


 


Potassium  4.8  (3.5-5.1)  mmol/L


 


Chloride  102  ()  mmol/L


 


Carbon Dioxide  25  (22-30)  mmol/L


 


BUN  33 H  (9-20)  mg/dL


 


Creatinine  0.70  (0.66-1.25)  mg/dL


 


Glucose  348 H  (74-99)  mg/dL


 


Calcium  9.1  (8.4-10.2)  mg/dL


 


AST  70 H  (17-59)  U/L


 


ALT  113 H  (21-72)  U/L


 


Alkaline Phosphatase  466 H  ()  U/L


 


Total Protein  5.4 L  (6.3-8.2)  g/dL


 


Albumin  3.0 L  (3.5-5.0)  g/dL








 Calcium panel











  06/18/18 Range/Units





  18:15 


 


Calcium  9.1  (8.4-10.2)  mg/dL


 


Albumin  3.0 L  (3.5-5.0)  g/dL








 Pituitary panel











  06/18/18 Range/Units





  18:15 


 


Sodium  138  (137-145)  mmol/L


 


Potassium  4.8  (3.5-5.1)  mmol/L


 


Chloride  102  ()  mmol/L


 


Carbon Dioxide  25  (22-30)  mmol/L


 


BUN  33 H  (9-20)  mg/dL


 


Creatinine  0.70  (0.66-1.25)  mg/dL


 


Glucose  348 H  (74-99)  mg/dL


 


Calcium  9.1  (8.4-10.2)  mg/dL








 Adrenal panel











  06/18/18 Range/Units





  18:15 


 


Sodium  138  (137-145)  mmol/L


 


Potassium  4.8  (3.5-5.1)  mmol/L


 


Chloride  102  ()  mmol/L


 


Carbon Dioxide  25  (22-30)  mmol/L


 


BUN  33 H  (9-20)  mg/dL


 


Creatinine  0.70  (0.66-1.25)  mg/dL


 


Glucose  348 H  (74-99)  mg/dL


 


Calcium  9.1  (8.4-10.2)  mg/dL


 


Total Bilirubin  0.2  (0.2-1.3)  mg/dL


 


AST  70 H  (17-59)  U/L


 


ALT  113 H  (21-72)  U/L


 


Alkaline Phosphatase  466 H  ()  U/L


 


Total Protein  5.4 L  (6.3-8.2)  g/dL


 


Albumin  3.0 L  (3.5-5.0)  g/dL














- Imaging


US - abdomen: report reviewed, image reviewed (Gallbladder does appear to be 

distended, I do not see any gallstones)





Assessment and Plan


(1) Gallbladder dilatation


Narrative/Plan: 


68-year-old male with gallbladder dilation


- The patient does not appear to have any abdominal pain whatsoever. He is 

tolerating a diet without abdominal pain.


- We will trend his transaminase levels and evaluate for cause


- There is no immediate plan for any surgical intervention due to lack of any 

abdominal pain or cholecystitis symptoms at this time


- Continue diabetic diet


- Medical management for pneumonia


- Will continue to follow and provide recommendations throughout the patient's 

admission





Thank You for this consultation. I look forward in providing in this patient's 

care.


Current Visit: Yes   Status: Acute   Code(s): K82.8 - OTHER SPECIFIED DISEASES 

OF GALLBLADDER   SNOMED Code(s): 963457539

## 2018-06-19 NOTE — P.HPIM
History of Present Illness


H&P Date: 06/19/18


Chief Complaint: Altered mental status.





This is a 68-year-old white male who recently had cervical neck repair at [facility] by PATRIZIA Anthonyner surgery.  The patient started having difficulty with 

mentation at the [facility].  Due to this, he was appropriately evaluated found to 

have pneumonia..  Of fever stated.  However, the patient struggles with 

orientation at times, which is not his baseline.  He has significant lordosis.  

Otherwise, he is a retired railroad employee.





Review of Systems


Constitutional: Denies chills, Denies fever


Eyes: denies blurred vision, denies pain


Ears, nose, mouth and throat: Denies headache, Denies sore throat


Cardiovascular: Reports as per HPI


Respiratory: Reports cough


Gastrointestinal: Denies abdominal pain, Denies diarrhea, Denies nausea, Denies 

vomiting





Past Medical History


Past Medical History: Asthma, Diabetes Mellitus, Hypertension


Additional Past Medical History / Comment(s): back problems, osteomyelitis in 

his back 2014


History of Any Multi-Drug Resistant Organisms: None Reported


Past Surgical History: Orthopedic Surgery, Tonsillectomy


Additional Past Surgical History / Comment(s): multiple back surgeries, 

cervical fusion, lumbar repair


Past Anesthesia/Blood Transfusion Reactions: No Reported Reaction


Past Psychological History: No Psychological Hx Reported


Smoking Status: Former smoker


Past Alcohol Use History: None Reported


Past Drug Use History: None Reported





Medications and Allergies


 Home Medications











 Medication  Instructions  Recorded  Confirmed  Type


 


Gabapentin [Neurontin] 300 mg PO TID 10/05/14 06/18/18 History


 


Omeprazole [PriLOSEC] 20 mg PO AC-BRKFST PRN 10/05/14 06/18/18 History


 


metFORMIN HCL [Glucophage] 1,000 mg PO AC-BID 10/05/14 06/18/18 History


 


Amoxic-Pot Clav 875-125Mg 1 tab PO Q12HR 06/18/18 06/18/18 History





[Augmentin 875-125]    


 


Atorvastatin [Lipitor] 40 mg PO HS 06/18/18 06/18/18 History


 


Bisacodyl [Dulcolax] 10 mg RECTAL DAILY PRN 06/18/18 06/18/18 History


 


Budesonide/Formoterol Fumarate 2 puff INHALATION BID 06/18/18 06/18/18 History





[Symbicort 160-4.5 Mcg Inhaler]    


 


Fluticasone/Vilanterol [Breo 1 inhalation PO Q24HR 06/18/18 06/18/18 History





Ellipta 100-25 Mcg Inhaler]    


 


Hydrochlorothiazide [Hydrodiuril] 12.5 mg PO DAILY 06/18/18 06/18/18 History


 


Insulin Aspart [NovoLOG See Protocol SQ AC-TID 06/18/18 06/18/18 History





(formulary)]    


 


Insulin Detemir [Levemir] 15 unit SQ DAILY 06/18/18 06/18/18 History


 


Ipratropium Nebulized [Atrovent 0.5 mg INHALATION RT-TID 06/18/18 06/18/18 

History





Nebulized]    


 


Lactulose 20 gm PO BID PRN 06/18/18 06/18/18 History


 


Magnesium Hydroxide [Milk of 7,200 mg PO AS DIRECTED PRN 06/18/18 06/18/18 

History





Magnesia Concentrate]    


 


Megestrol Acetate [Megace] 200 mg PO TID 06/18/18 06/18/18 History


 


Methocarbamol [Robaxin] 500 mg PO Q8H PRN 06/18/18 06/18/18 History


 


Metoclopramide HCl [Reglan] 10 mg PO QID 06/18/18 06/18/18 History


 


Metoprolol Tartrate [Lopressor] 25 mg PO BID 06/18/18 06/18/18 History


 


Montelukast [Singulair] 10 mg PO DAILY 06/18/18 06/18/18 History


 


Na Phos,M-B/Na Phos,Di-Ba [Fleet 133 ml RECTAL ONCE PRN 06/18/18 06/18/18 

History





Adult]    


 


Vits A and D/White Pet/Lanolin [A 1 applic TOPICAL DAILY PRN 06/18/18 06/18/18 

History





and D Ointment]    


 


oxyCODONE HCL [oxyCODONE HCL ER] 40 mg PO Q12HR 06/18/18 06/18/18 History


 


oxyCODONE-APAP 5-325MG [Percocet 2 tab PO Q4HR PRN 06/18/18 06/18/18 History





5-325 mg]    











 Allergies











Allergy/AdvReac Type Severity Reaction Status Date / Time


 


diazepam [From Valium] Allergy  Hallucinati Verified 06/18/18 18:28





   ons  


 


morphine Allergy  Rapid Verified 06/18/18 18:28





   Heart Rate  














Physical Exam


Vitals: 


 Vital Signs











  Temp Pulse Pulse Resp BP BP Pulse Ox


 


 06/19/18 06:31  98.2 F   57 L  18   152/77  98


 


 06/18/18 23:00  98.6 F   77  18   134/62  97


 


 06/18/18 22:14  98.1 F   58 L  16   182/77  97


 


 06/18/18 21:26        97


 


 06/18/18 21:03  97.9 F  73   18  150/73   97


 


 06/18/18 19:51   84   18  148/70   98


 


 06/18/18 17:46  99.9 F H  84   20  118/55   95








 Intake and Output











 06/18/18 06/19/18 06/19/18





 22:59 06:59 14:59


 


Intake Total  400 


 


Balance  400 


 


Intake:   


 


  Oral  400 


 


Other:   


 


  Voiding Method Urinal  





 Diaper  





 Incontinent  


 


  # Voids  2 


 


  Weight 75.5 kg  














- Constitutional


General appearance: thin





- EENT


Eyes: EOMI





- Neck


Neck: no lymphadenopathy





- Respiratory


Respiratory: bilateral: diminished





- Cardiovascular


Rhythm: regular


Heart sounds: normal: S1, S2


Abnormal Heart Sounds: no S3 Gallop





- Gastrointestinal


General gastrointestinal: soft, no tenderness





- Neurologic


Neurologic: CNII-XII intact





- Musculoskeletal


Musculoskeletal: generalized weakness





Results


CBC & Chem 7: 


 06/18/18 18:15





 06/18/18 18:15


Labs: 


 Abnormal Lab Results - Last 24 Hours (Table)











  06/18/18 06/18/18 06/18/18 Range/Units





  18:15 18:15 18:15 


 


RBC  3.47 L    (4.30-5.90)  m/uL


 


Hgb  10.6 L    (13.0-17.5)  gm/dL


 


Hct  32.5 L    (39.0-53.0)  %


 


Lymphocytes #  0.5 L    (1.0-4.8)  k/uL


 


APTT    21.0 L  (22.0-30.0)  sec


 


BUN   33 H   (9-20)  mg/dL


 


Glucose   348 H   (74-99)  mg/dL


 


POC Glucose (mg/dL)     (75-99)  mg/dL


 


AST   70 H   (17-59)  U/L


 


ALT   113 H   (21-72)  U/L


 


Alkaline Phosphatase   466 H   ()  U/L


 


Total Protein   5.4 L   (6.3-8.2)  g/dL


 


Albumin   3.0 L   (3.5-5.0)  g/dL


 


Urine Protein     (Negative)  


 


Urine Glucose (UA)     (Negative)  


 


Urine Mucus     (None)  /hpf














  06/18/18 06/18/18 06/19/18 Range/Units





  19:35 22:31 06:47 


 


RBC     (4.30-5.90)  m/uL


 


Hgb     (13.0-17.5)  gm/dL


 


Hct     (39.0-53.0)  %


 


Lymphocytes #     (1.0-4.8)  k/uL


 


APTT     (22.0-30.0)  sec


 


BUN     (9-20)  mg/dL


 


Glucose     (74-99)  mg/dL


 


POC Glucose (mg/dL)   301 H  282 H  (75-99)  mg/dL


 


AST     (17-59)  U/L


 


ALT     (21-72)  U/L


 


Alkaline Phosphatase     ()  U/L


 


Total Protein     (6.3-8.2)  g/dL


 


Albumin     (3.5-5.0)  g/dL


 


Urine Protein  1+ H    (Negative)  


 


Urine Glucose (UA)  4+ H    (Negative)  


 


Urine Mucus  Rare H    (None)  /hpf








 Microbiology - Last 24 Hours (Table)











 06/18/18 19:35 Urine Culture - Preliminary





 Urine,Voided 














Thrombosis Risk Factor Assmnt





- Choose All That Apply


Any of the Below Risk Factors Present?: Yes


Other Risk Factors: Yes


Each Risk Factor Represents 2 Points: Age 61-74 years


Thrombosis Risk Factor Assessment Total Risk Factor Score: 2


Thrombosis Risk Factor Assessment Level: Low Risk





Assessment and Plan


(1) Encephalopathy


Current Visit: Yes   Status: Acute   Code(s): G93.40 - ENCEPHALOPATHY, 

UNSPECIFIED   SNOMED Code(s): 27186400


   





(2) Gallbladder dilatation


Current Visit: Yes   Status: Acute   Code(s): K82.8 - OTHER SPECIFIED DISEASES 

OF GALLBLADDER   SNOMED Code(s): 270137958


   





(3) HCAP (healthcare-associated pneumonia)


Current Visit: Yes   Status: Acute   Code(s): J18.9 - PNEUMONIA, UNSPECIFIED 

ORGANISM   SNOMED Code(s): 174311599


   





(4) Transaminitis


Current Visit: Yes   Status: Acute   Code(s): R74.0 - NONSPEC ELEV OF LEVELS OF 

TRANSAMNS & LACTIC ACID DEHYDRGNSE   SNOMED Code(s): 274024178


   


Plan: 





Empiric antibiotic treatment.





Consult surgery for gallbladder issue.





Reconcile medications.





Place on sliding scale.





Consider pulmonology referral if no better.





She orders otherwise.


Time with Patient: Greater than 30

## 2018-06-20 LAB
ALBUMIN SERPL-MCNC: 2.9 G/DL (ref 3.5–5)
ALP SERPL-CCNC: 506 U/L (ref 38–126)
ALT SERPL-CCNC: 111 U/L (ref 21–72)
ANION GAP SERPL CALC-SCNC: 9 MMOL/L
AST SERPL-CCNC: 115 U/L (ref 17–59)
BASOPHILS # BLD AUTO: 0 K/UL (ref 0–0.2)
BASOPHILS NFR BLD AUTO: 0 %
BILIRUB INDIRECT SERPL-MCNC: 0 MG/DL (ref 0–1.1)
BILIRUBIN DIRECT+TOT PNL SERPL-MCNC: 0.3 MG/DL (ref 0–0.2)
BUN SERPL-SCNC: 19 MG/DL (ref 9–20)
CALCIUM SPEC-MCNC: 9.3 MG/DL (ref 8.4–10.2)
CHLORIDE SERPL-SCNC: 101 MMOL/L (ref 98–107)
CO2 SERPL-SCNC: 28 MMOL/L (ref 22–30)
EOSINOPHIL # BLD AUTO: 0.1 K/UL (ref 0–0.7)
EOSINOPHIL NFR BLD AUTO: 1 %
ERYTHROCYTE [DISTWIDTH] IN BLOOD BY AUTOMATED COUNT: 3.73 M/UL (ref 4.3–5.9)
ERYTHROCYTE [DISTWIDTH] IN BLOOD: 13.7 % (ref 11.5–15.5)
GLUCOSE BLD-MCNC: 117 MG/DL (ref 75–99)
GLUCOSE BLD-MCNC: 150 MG/DL (ref 75–99)
GLUCOSE BLD-MCNC: 200 MG/DL (ref 75–99)
GLUCOSE BLD-MCNC: 354 MG/DL (ref 75–99)
GLUCOSE SERPL-MCNC: 205 MG/DL (ref 74–99)
HCT VFR BLD AUTO: 34.9 % (ref 39–53)
HGB BLD-MCNC: 11.5 GM/DL (ref 13–17.5)
LYMPHOCYTES # SPEC AUTO: 2.3 K/UL (ref 1–4.8)
LYMPHOCYTES NFR SPEC AUTO: 25 %
MCH RBC QN AUTO: 30.8 PG (ref 25–35)
MCHC RBC AUTO-ENTMCNC: 32.9 G/DL (ref 31–37)
MCV RBC AUTO: 93.6 FL (ref 80–100)
MONOCYTES # BLD AUTO: 0.9 K/UL (ref 0–1)
MONOCYTES NFR BLD AUTO: 9 %
NEUTROPHILS # BLD AUTO: 5.9 K/UL (ref 1.3–7.7)
NEUTROPHILS NFR BLD AUTO: 64 %
PLATELET # BLD AUTO: 482 K/UL (ref 150–450)
POTASSIUM SERPL-SCNC: 4.8 MMOL/L (ref 3.5–5.1)
PROT SERPL-MCNC: 5.5 G/DL (ref 6.3–8.2)
SODIUM SERPL-SCNC: 138 MMOL/L (ref 137–145)
WBC # BLD AUTO: 9.2 K/UL (ref 3.8–10.6)

## 2018-06-20 RX ADMIN — BUDESONIDE AND FORMOTEROL FUMARATE DIHYDRATE SCH PUFF: 160; 4.5 AEROSOL RESPIRATORY (INHALATION) at 08:53

## 2018-06-20 RX ADMIN — OXYCODONE AND ACETAMINOPHEN PRN EACH: 5; 325 TABLET ORAL at 10:52

## 2018-06-20 RX ADMIN — METOCLOPRAMIDE SCH MG: 10 TABLET ORAL at 15:27

## 2018-06-20 RX ADMIN — BUDESONIDE AND FORMOTEROL FUMARATE DIHYDRATE SCH PUFF: 160; 4.5 AEROSOL RESPIRATORY (INHALATION) at 19:25

## 2018-06-20 RX ADMIN — INSULIN ASPART SCH: 100 INJECTION, SOLUTION INTRAVENOUS; SUBCUTANEOUS at 17:25

## 2018-06-20 RX ADMIN — GABAPENTIN SCH MG: 300 CAPSULE ORAL at 22:23

## 2018-06-20 RX ADMIN — INSULIN DETEMIR SCH UNIT: 100 INJECTION, SOLUTION SUBCUTANEOUS at 08:58

## 2018-06-20 RX ADMIN — MONTELUKAST SODIUM SCH MG: 10 TABLET, FILM COATED ORAL at 09:00

## 2018-06-20 RX ADMIN — GABAPENTIN SCH MG: 300 CAPSULE ORAL at 09:00

## 2018-06-20 RX ADMIN — GABAPENTIN SCH MG: 300 CAPSULE ORAL at 17:26

## 2018-06-20 RX ADMIN — IPRATROPIUM BROMIDE SCH MG: 0.5 SOLUTION RESPIRATORY (INHALATION) at 19:25

## 2018-06-20 RX ADMIN — OXYCODONE HYDROCHLORIDE SCH MG: 20 TABLET, FILM COATED, EXTENDED RELEASE ORAL at 22:24

## 2018-06-20 RX ADMIN — HEPARIN SODIUM SCH UNIT: 5000 INJECTION, SOLUTION INTRAVENOUS; SUBCUTANEOUS at 13:35

## 2018-06-20 RX ADMIN — SODIUM CHLORIDE SCH MLS/HR: 9 INJECTION, SOLUTION INTRAVENOUS at 11:04

## 2018-06-20 RX ADMIN — OXYCODONE AND ACETAMINOPHEN PRN EACH: 5; 325 TABLET ORAL at 15:27

## 2018-06-20 RX ADMIN — OXYCODONE HYDROCHLORIDE SCH MG: 20 TABLET, FILM COATED, EXTENDED RELEASE ORAL at 08:56

## 2018-06-20 RX ADMIN — MEGESTROL ACETATE SCH MG: 40 SUSPENSION ORAL at 22:24

## 2018-06-20 RX ADMIN — METOCLOPRAMIDE SCH MG: 10 TABLET ORAL at 09:00

## 2018-06-20 RX ADMIN — METOPROLOL TARTRATE SCH MG: 25 TABLET, FILM COATED ORAL at 08:59

## 2018-06-20 RX ADMIN — SODIUM CHLORIDE SCH MLS/HR: 9 INJECTION, SOLUTION INTRAVENOUS at 20:29

## 2018-06-20 RX ADMIN — METOCLOPRAMIDE SCH MG: 10 TABLET ORAL at 17:26

## 2018-06-20 RX ADMIN — INSULIN ASPART SCH: 100 INJECTION, SOLUTION INTRAVENOUS; SUBCUTANEOUS at 22:25

## 2018-06-20 RX ADMIN — IPRATROPIUM BROMIDE SCH MG: 0.5 SOLUTION RESPIRATORY (INHALATION) at 08:53

## 2018-06-20 RX ADMIN — IPRATROPIUM BROMIDE SCH MG: 0.5 SOLUTION RESPIRATORY (INHALATION) at 13:53

## 2018-06-20 RX ADMIN — INSULIN ASPART SCH UNIT: 100 INJECTION, SOLUTION INTRAVENOUS; SUBCUTANEOUS at 13:17

## 2018-06-20 RX ADMIN — HEPARIN SODIUM SCH UNIT: 5000 INJECTION, SOLUTION INTRAVENOUS; SUBCUTANEOUS at 22:24

## 2018-06-20 RX ADMIN — MEGESTROL ACETATE SCH MG: 40 SUSPENSION ORAL at 17:26

## 2018-06-20 RX ADMIN — ATORVASTATIN CALCIUM SCH MG: 40 TABLET, FILM COATED ORAL at 22:24

## 2018-06-20 RX ADMIN — METOCLOPRAMIDE SCH MG: 10 TABLET ORAL at 22:24

## 2018-06-20 RX ADMIN — HYDROCHLOROTHIAZIDE SCH MG: 12.5 CAPSULE ORAL at 09:00

## 2018-06-20 RX ADMIN — METOPROLOL TARTRATE SCH MG: 25 TABLET, FILM COATED ORAL at 22:24

## 2018-06-20 RX ADMIN — MEGESTROL ACETATE SCH MG: 40 SUSPENSION ORAL at 09:00

## 2018-06-20 NOTE — P.PN
Subjective


Progress Note Date: 06/20/18


Principal diagnosis: 





Element of pneumonia.





This is a continue present 68-year-old white male essentially admitted for 

pneumonia.  He is postoperative surgical repair for cervical disc herniation/

lordosis.  The patient states he feels much better.  Seems to more oriented 

today.  No sniffing chest pain or cough stated.  Pain is controlled.  We will 

place on sliding scale otherwise.





Objective





- Vital Signs


Vital signs: 


 Vital Signs











Temp  97.9 F   06/20/18 06:18


 


Pulse  63   06/20/18 06:18


 


Resp  18   06/20/18 06:18


 


BP  148/67   06/20/18 06:18


 


Pulse Ox  96   06/20/18 06:18








 Intake & Output











 06/19/18 06/20/18 06/20/18





 18:59 06:59 18:59


 


Intake Total  840 


 


Output Total 200  


 


Balance -200 840 


 


Weight 75.5 kg  


 


Intake:   


 


  Oral  840 


 


Output:   


 


  Urine 200  


 


Other:   


 


  Voiding Method Urinal Urinal 





 Diaper Diaper 





 Incontinent Incontinent 


 


  # Voids 1 1 














- Constitutional


General appearance: Present: no acute distress





- EENT


Eyes: Absent: abnormal pupil





- Respiratory


Respiratory: bilateral: diminished





- Cardiovascular


Rhythm: regular


Heart sounds: normal: S1, S2


Abnormal Heart Sounds: Absent: S3 Gallop





- Gastrointestinal


General gastrointestinal: Present: soft.  Absent: tenderness





- Neurologic


Neurologic: Present: CNII-XII intact.  Absent: focal deficits





- Psychiatric


Psychiatric: Present: A&O x's 3





- Labs


CBC & Chem 7: 


 06/19/18 14:07





 06/19/18 14:07


Labs: 


 Abnormal Lab Results - Last 24 Hours (Table)











  06/18/18 06/19/18 06/19/18 Range/Units





  18:15 11:02 14:07 


 


WBC    11.7 H  (3.8-10.6)  k/uL


 


RBC    3.50 L  (4.30-5.90)  m/uL


 


Hgb    10.8 L  (13.0-17.5)  gm/dL


 


Hct    32.4 L  (39.0-53.0)  %


 


Neutrophils #    8.8 H  (1.3-7.7)  k/uL


 


BUN     (9-20)  mg/dL


 


Glucose     (74-99)  mg/dL


 


POC Glucose (mg/dL)   285 H   (75-99)  mg/dL


 


Hemoglobin A1c  9.3 H    (4.0-6.0)  %


 


ALT     (21-72)  U/L


 


Alkaline Phosphatase     ()  U/L


 


Total Protein     (6.3-8.2)  g/dL


 


Albumin     (3.5-5.0)  g/dL














  06/19/18 06/19/18 06/19/18 Range/Units





  14:07 17:16 20:34 


 


WBC     (3.8-10.6)  k/uL


 


RBC     (4.30-5.90)  m/uL


 


Hgb     (13.0-17.5)  gm/dL


 


Hct     (39.0-53.0)  %


 


Neutrophils #     (1.3-7.7)  k/uL


 


BUN  23 H    (9-20)  mg/dL


 


Glucose  207 H    (74-99)  mg/dL


 


POC Glucose (mg/dL)   199 H  222 H  (75-99)  mg/dL


 


Hemoglobin A1c     (4.0-6.0)  %


 


ALT  93 H    (21-72)  U/L


 


Alkaline Phosphatase  393 H    ()  U/L


 


Total Protein  5.3 L    (6.3-8.2)  g/dL


 


Albumin  2.9 L    (3.5-5.0)  g/dL














  06/20/18 Range/Units





  07:15 


 


WBC   (3.8-10.6)  k/uL


 


RBC   (4.30-5.90)  m/uL


 


Hgb   (13.0-17.5)  gm/dL


 


Hct   (39.0-53.0)  %


 


Neutrophils #   (1.3-7.7)  k/uL


 


BUN   (9-20)  mg/dL


 


Glucose   (74-99)  mg/dL


 


POC Glucose (mg/dL)  200 H  (75-99)  mg/dL


 


Hemoglobin A1c   (4.0-6.0)  %


 


ALT   (21-72)  U/L


 


Alkaline Phosphatase   ()  U/L


 


Total Protein   (6.3-8.2)  g/dL


 


Albumin   (3.5-5.0)  g/dL








 Microbiology - Last 24 Hours (Table)











 06/18/18 19:35 Urine Culture - Final





 Urine,Voided 


 


 06/18/18 18:15 Blood Culture - Preliminary





 Blood    No Growth after 24 hours














Assessment and Plan


(1) Encephalopathy


Current Visit: Yes   Status: Acute   Code(s): G93.40 - ENCEPHALOPATHY, 

UNSPECIFIED   SNOMED Code(s): 26603088


   





(2) Gallbladder dilatation


Current Visit: Yes   Status: Acute   Code(s): K82.8 - OTHER SPECIFIED DISEASES 

OF GALLBLADDER   SNOMED Code(s): 697598881


   





(3) HCAP (healthcare-associated pneumonia)


Current Visit: Yes   Status: Acute   Code(s): J18.9 - PNEUMONIA, UNSPECIFIED 

ORGANISM   SNOMED Code(s): 073809275


   





(4) Transaminitis


Current Visit: Yes   Status: Acute   Code(s): R74.0 - NONSPEC ELEV OF LEVELS OF 

TRANSAMNS & LACTIC ACID DEHYDRGNSE   SNOMED Code(s): 831594281


   


Plan: 





Continue current regimen of treatment.





Check CBC and CMP in a.m.





PTOT consultation if not done.





We'll continue to follow.


Time with Patient: Less than 30

## 2018-06-20 NOTE — P.PN
Subjective


Progress Note Date: 06/20/18





Patient seen and examined at bedside. Tolerating his breakfast. Denies any 

abdominal pain. States he is having flatus but denies any bowel movements. 

Denies any nausea or emesis episodes.





Objective





- Vital Signs


Vital signs: 


 Vital Signs











Temp  96.7 F L  06/20/18 10:23


 


Pulse  68   06/20/18 10:23


 


Resp  16   06/20/18 10:23


 


BP  139/63   06/20/18 10:23


 


Pulse Ox  95   06/20/18 10:23








 Intake & Output











 06/19/18 06/20/18 06/20/18





 18:59 06:59 18:59


 


Intake Total  840 


 


Output Total 200  250


 


Balance -200 840 -250


 


Weight 75.5 kg  


 


Intake:   


 


  Oral  840 


 


Output:   


 


  Urine 200  250


 


Other:   


 


  Voiding Method Urinal Urinal 





 Diaper Diaper 





 Incontinent Incontinent 


 


  # Voids 1 1 1














- Constitutional


General appearance: Present: cooperative





- Neck


Details: 





Aspen collar in place





- Respiratory


Details: 





No difficulty with respiration





- Gastrointestinal


Gastrointestinal Comment(s): 





Soft, nontender, nondistended, no rebound, guarding





- Psychiatric


Psychiatric: Present: A&O x's 3





- Labs


CBC & Chem 7: 


 06/20/18 07:57





 06/20/18 07:57


Labs: 


 Abnormal Lab Results - Last 24 Hours (Table)











  06/18/18 06/19/18 06/19/18 Range/Units





  18:15 14:07 14:07 


 


WBC   11.7 H   (3.8-10.6)  k/uL


 


RBC   3.50 L   (4.30-5.90)  m/uL


 


Hgb   10.8 L   (13.0-17.5)  gm/dL


 


Hct   32.4 L   (39.0-53.0)  %


 


Plt Count     (150-450)  k/uL


 


Neutrophils #   8.8 H   (1.3-7.7)  k/uL


 


D-Dimer     (<0.60)  mg/L FEU


 


BUN    23 H  (9-20)  mg/dL


 


Creatinine     (0.66-1.25)  mg/dL


 


Glucose    207 H  (74-99)  mg/dL


 


POC Glucose (mg/dL)     (75-99)  mg/dL


 


Hemoglobin A1c  9.3 H    (4.0-6.0)  %


 


Delta Bilirubin     (0.0-0.2)  mg/dL


 


AST     (17-59)  U/L


 


ALT    93 H  (21-72)  U/L


 


Alkaline Phosphatase    393 H  ()  U/L


 


Total Protein    5.3 L  (6.3-8.2)  g/dL


 


Albumin    2.9 L  (3.5-5.0)  g/dL














  06/19/18 06/19/18 06/20/18 Range/Units





  17:16 20:34 07:15 


 


WBC     (3.8-10.6)  k/uL


 


RBC     (4.30-5.90)  m/uL


 


Hgb     (13.0-17.5)  gm/dL


 


Hct     (39.0-53.0)  %


 


Plt Count     (150-450)  k/uL


 


Neutrophils #     (1.3-7.7)  k/uL


 


D-Dimer     (<0.60)  mg/L FEU


 


BUN     (9-20)  mg/dL


 


Creatinine     (0.66-1.25)  mg/dL


 


Glucose     (74-99)  mg/dL


 


POC Glucose (mg/dL)  199 H  222 H  200 H  (75-99)  mg/dL


 


Hemoglobin A1c     (4.0-6.0)  %


 


Delta Bilirubin     (0.0-0.2)  mg/dL


 


AST     (17-59)  U/L


 


ALT     (21-72)  U/L


 


Alkaline Phosphatase     ()  U/L


 


Total Protein     (6.3-8.2)  g/dL


 


Albumin     (3.5-5.0)  g/dL














  06/20/18 06/20/18 06/20/18 Range/Units





  07:57 07:57 10:44 


 


WBC     (3.8-10.6)  k/uL


 


RBC   3.73 L   (4.30-5.90)  m/uL


 


Hgb   11.5 L   (13.0-17.5)  gm/dL


 


Hct   34.9 L   (39.0-53.0)  %


 


Plt Count   482 H   (150-450)  k/uL


 


Neutrophils #     (1.3-7.7)  k/uL


 


D-Dimer    2.08 H  (<0.60)  mg/L FEU


 


BUN     (9-20)  mg/dL


 


Creatinine  0.60 L    (0.66-1.25)  mg/dL


 


Glucose  205 H    (74-99)  mg/dL


 


POC Glucose (mg/dL)     (75-99)  mg/dL


 


Hemoglobin A1c     (4.0-6.0)  %


 


Delta Bilirubin  0.3 H    (0.0-0.2)  mg/dL


 


AST  115 H    (17-59)  U/L


 


ALT  111 H    (21-72)  U/L


 


Alkaline Phosphatase  506 H    ()  U/L


 


Total Protein  5.5 L    (6.3-8.2)  g/dL


 


Albumin  2.9 L    (3.5-5.0)  g/dL














  06/20/18 Range/Units





  11:50 


 


WBC   (3.8-10.6)  k/uL


 


RBC   (4.30-5.90)  m/uL


 


Hgb   (13.0-17.5)  gm/dL


 


Hct   (39.0-53.0)  %


 


Plt Count   (150-450)  k/uL


 


Neutrophils #   (1.3-7.7)  k/uL


 


D-Dimer   (<0.60)  mg/L FEU


 


BUN   (9-20)  mg/dL


 


Creatinine   (0.66-1.25)  mg/dL


 


Glucose   (74-99)  mg/dL


 


POC Glucose (mg/dL)  354 H  (75-99)  mg/dL


 


Hemoglobin A1c   (4.0-6.0)  %


 


Delta Bilirubin   (0.0-0.2)  mg/dL


 


AST   (17-59)  U/L


 


ALT   (21-72)  U/L


 


Alkaline Phosphatase   ()  U/L


 


Total Protein   (6.3-8.2)  g/dL


 


Albumin   (3.5-5.0)  g/dL








 Microbiology - Last 24 Hours (Table)











 06/18/18 19:35 Urine Culture - Final





 Urine,Voided 


 


 06/18/18 18:15 Blood Culture - Preliminary





 Blood    No Growth after 24 hours














Assessment and Plan


(1) Gallbladder dilatation


Narrative/Plan: 


68-year-old male with gallbladder dilation


- Denies abdominal pain, tolerating diet


- There is no immediate plan for any surgical intervention due to lack of any 

abdominal pain or cholecystitis symptoms at this time


- Continue diabetic diet


- Medical management for pneumonia


- Will continue to follow and provide recommendations throughout the patient's 

admission


Current Visit: Yes   Status: Acute   Code(s): K82.8 - OTHER SPECIFIED DISEASES 

OF GALLBLADDER   SNOMED Code(s): 685788477

## 2018-06-20 NOTE — US
EXAMINATION TYPE: US venous doppler duplex LE BI

 

DATE OF EXAM: 6/20/2018 10:28 AM

 

COMPARISON: NONE

 

CLINICAL HISTORY: Pain Bilateral legs.

 

SIDE PERFORMED: Bilateral  

 

TECHNIQUE:  The lower extremity deep venous system is examined utilizing real time linear array sonog
nita with graded compression, doppler sonography and color-flow sonography.

 

VESSELS IMAGED:

External Iliac Vein (EIV)

Common Femoral Vein

Deep Femoral Vein

Greater Saphenous Vein *

Femoral Vein

Popliteal Vein

Small Saphenous Vein *

Proximal Calf Veins

(* superficial vessels)

 

 

 

Right Leg:  Negative for DVT

 

Left Leg:  Negative for DVT 

 

 

 

IMPRESSION:

1. Lower extremity ultrasound negative for deep venous thrombosis.

## 2018-06-21 VITALS — HEART RATE: 63 BPM

## 2018-06-21 VITALS — TEMPERATURE: 98.8 F | RESPIRATION RATE: 16 BRPM | DIASTOLIC BLOOD PRESSURE: 70 MMHG | SYSTOLIC BLOOD PRESSURE: 148 MMHG

## 2018-06-21 LAB
ALBUMIN SERPL-MCNC: 2.9 G/DL (ref 3.5–5)
ALP SERPL-CCNC: 563 U/L (ref 38–126)
ALT SERPL-CCNC: 131 U/L (ref 21–72)
ANION GAP SERPL CALC-SCNC: 8 MMOL/L
AST SERPL-CCNC: 126 U/L (ref 17–59)
BUN SERPL-SCNC: 18 MG/DL (ref 9–20)
CALCIUM SPEC-MCNC: 9.1 MG/DL (ref 8.4–10.2)
CHLORIDE SERPL-SCNC: 101 MMOL/L (ref 98–107)
CO2 SERPL-SCNC: 29 MMOL/L (ref 22–30)
ERYTHROCYTE [DISTWIDTH] IN BLOOD BY AUTOMATED COUNT: 3.7 M/UL (ref 4.3–5.9)
ERYTHROCYTE [DISTWIDTH] IN BLOOD: 13.8 % (ref 11.5–15.5)
GLUCOSE BLD-MCNC: 143 MG/DL (ref 75–99)
GLUCOSE BLD-MCNC: 211 MG/DL (ref 75–99)
GLUCOSE BLD-MCNC: 228 MG/DL (ref 75–99)
GLUCOSE SERPL-MCNC: 266 MG/DL (ref 74–99)
HCT VFR BLD AUTO: 34.8 % (ref 39–53)
HGB BLD-MCNC: 11.5 GM/DL (ref 13–17.5)
MCH RBC QN AUTO: 31.1 PG (ref 25–35)
MCHC RBC AUTO-ENTMCNC: 33.1 G/DL (ref 31–37)
MCV RBC AUTO: 93.9 FL (ref 80–100)
PLATELET # BLD AUTO: 522 K/UL (ref 150–450)
POTASSIUM SERPL-SCNC: 4.9 MMOL/L (ref 3.5–5.1)
PROT SERPL-MCNC: 5.4 G/DL (ref 6.3–8.2)
SODIUM SERPL-SCNC: 138 MMOL/L (ref 137–145)
WBC # BLD AUTO: 10.7 K/UL (ref 3.8–10.6)

## 2018-06-21 RX ADMIN — MEGESTROL ACETATE SCH MG: 40 SUSPENSION ORAL at 17:02

## 2018-06-21 RX ADMIN — OXYCODONE HYDROCHLORIDE SCH MG: 20 TABLET, FILM COATED, EXTENDED RELEASE ORAL at 08:37

## 2018-06-21 RX ADMIN — IPRATROPIUM BROMIDE SCH MG: 0.5 SOLUTION RESPIRATORY (INHALATION) at 07:17

## 2018-06-21 RX ADMIN — OXYCODONE AND ACETAMINOPHEN PRN EACH: 5; 325 TABLET ORAL at 17:01

## 2018-06-21 RX ADMIN — SODIUM CHLORIDE SCH MLS/HR: 9 INJECTION, SOLUTION INTRAVENOUS at 12:15

## 2018-06-21 RX ADMIN — METOCLOPRAMIDE SCH MG: 10 TABLET ORAL at 08:38

## 2018-06-21 RX ADMIN — IPRATROPIUM BROMIDE SCH: 0.5 SOLUTION RESPIRATORY (INHALATION) at 13:03

## 2018-06-21 RX ADMIN — INSULIN ASPART SCH: 100 INJECTION, SOLUTION INTRAVENOUS; SUBCUTANEOUS at 17:06

## 2018-06-21 RX ADMIN — METOCLOPRAMIDE SCH MG: 10 TABLET ORAL at 12:16

## 2018-06-21 RX ADMIN — GABAPENTIN SCH MG: 300 CAPSULE ORAL at 17:02

## 2018-06-21 RX ADMIN — BUDESONIDE AND FORMOTEROL FUMARATE DIHYDRATE SCH PUFF: 160; 4.5 AEROSOL RESPIRATORY (INHALATION) at 07:17

## 2018-06-21 RX ADMIN — MEGESTROL ACETATE SCH MG: 40 SUSPENSION ORAL at 08:39

## 2018-06-21 RX ADMIN — METOCLOPRAMIDE SCH MG: 10 TABLET ORAL at 17:02

## 2018-06-21 RX ADMIN — INSULIN ASPART SCH UNIT: 100 INJECTION, SOLUTION INTRAVENOUS; SUBCUTANEOUS at 08:39

## 2018-06-21 RX ADMIN — OXYCODONE AND ACETAMINOPHEN PRN EACH: 5; 325 TABLET ORAL at 12:16

## 2018-06-21 RX ADMIN — GABAPENTIN SCH MG: 300 CAPSULE ORAL at 08:38

## 2018-06-21 RX ADMIN — INSULIN DETEMIR SCH UNIT: 100 INJECTION, SOLUTION SUBCUTANEOUS at 08:47

## 2018-06-21 RX ADMIN — INSULIN ASPART SCH UNIT: 100 INJECTION, SOLUTION INTRAVENOUS; SUBCUTANEOUS at 12:15

## 2018-06-21 RX ADMIN — OXYCODONE AND ACETAMINOPHEN PRN EACH: 5; 325 TABLET ORAL at 04:20

## 2018-06-21 RX ADMIN — HYDROCHLOROTHIAZIDE SCH MG: 12.5 CAPSULE ORAL at 08:38

## 2018-06-21 RX ADMIN — SODIUM CHLORIDE SCH MLS/HR: 9 INJECTION, SOLUTION INTRAVENOUS at 03:08

## 2018-06-21 RX ADMIN — METOPROLOL TARTRATE SCH MG: 25 TABLET, FILM COATED ORAL at 08:38

## 2018-06-21 RX ADMIN — HEPARIN SODIUM SCH UNIT: 5000 INJECTION, SOLUTION INTRAVENOUS; SUBCUTANEOUS at 08:39

## 2018-06-21 RX ADMIN — MONTELUKAST SODIUM SCH MG: 10 TABLET, FILM COATED ORAL at 08:38

## 2018-06-21 NOTE — P.DS
Providers


Date of admission: 


06/18/18 21:33





Attending physician: 


Burt Olivo





Consults: 





 





06/18/18 21:33


Consult Physician Routine 


   Consulting Provider: Neno Manning


   Consult Reason/Comments: Transaminitis, GB dilation


   Do you want consulting provider notified?: Already Contacted











Primary care physician: 


Burt Olivo








- Discharge Diagnosis(es)


(1) Encephalopathy


Current Visit: Yes   Status: Acute   





(2) Gallbladder dilatation


Current Visit: Yes   Status: Acute   





(3) HCAP (healthcare-associated pneumonia)


Current Visit: Yes   Status: Acute   





(4) Transaminitis


Current Visit: Yes   Status: Acute   


Hospital Course: 





This is a discharge summary 68-year-old white male essentially admitted for 

pneumonia.  He is postoperative cervical repair.  He has significant lordosis 

secondary to DJD.  He was also found to have elevated transaminases but 

essentially admitted for healthcare associated pneumonia.  The patient was 

treated appropriately anabiotic therapy and was stabilized.  Mental status is 

now back to baseline and retransferred back to [facility] for continued rehab.


Patient Condition at Discharge: Stable





Plan - Discharge Summary


Discharge Rx Participant: No


New Discharge Prescriptions: 


Continue


   Omeprazole [PriLOSEC] 20 mg PO AC-BRKFST PRN


     PRN Reason: Indigestion


   metFORMIN HCL [Glucophage] 1,000 mg PO AC-BID


   Gabapentin [Neurontin] 300 mg PO TID


   Amoxic-Pot Clav 875-125Mg [Augmentin 875-125] 1 tab PO Q12HR


   Atorvastatin [Lipitor] 40 mg PO HS


   Bisacodyl [Dulcolax] 10 mg RECTAL DAILY PRN


     PRN Reason: Constipation


   Budesonide/Formoterol Fumarate [Symbicort 160-4.5 Mcg Inhaler] 2 puff 

INHALATION BID


   Fluticasone/Vilanterol [Breo Ellipta 100-25 Mcg Inhaler] 1 inhalation PO 

Q24HR


   Hydrochlorothiazide [Hydrodiuril] 12.5 mg PO DAILY


   Insulin Aspart [NovoLOG (formulary)] See Protocol SQ AC-TID


   Insulin Detemir [Levemir] 15 unit SQ DAILY


   Ipratropium Nebulized [Atrovent Nebulized] 0.5 mg INHALATION RT-TID


   Lactulose 20 gm PO BID PRN


     PRN Reason: Constipation


   Magnesium Hydroxide [Milk of Magnesia Concentrate] 7,200 mg PO AS DIRECTED 

PRN


     PRN Reason: Constipation


   Megestrol Acetate [Megace] 200 mg PO TID


   Methocarbamol [Robaxin] 500 mg PO Q8H PRN


     PRN Reason: Spasms


   Metoclopramide HCl [Reglan] 10 mg PO QID


   Metoprolol Tartrate [Lopressor] 25 mg PO BID


   Montelukast [Singulair] 10 mg PO DAILY


   Na Phos,M-B/Na Phos,Di-Ba [Fleet Adult] 133 ml RECTAL ONCE PRN


     PRN Reason: Constipation


   Vits A and D/White Pet/Lanolin [A and D Ointment] 1 applic TOPICAL DAILY PRN


     PRN Reason: RASH 


   oxyCODONE HCL [oxyCODONE HCL ER] 40 mg PO Q12HR #60 tab.er.12h


   oxyCODONE-APAP 5-325MG [Percocet 5-325 mg] 2 tab PO Q4HR PRN #240 tab


     PRN Reason: Pain


Discharge Medication List





Gabapentin [Neurontin] 300 mg PO TID 10/05/14 [History]


Omeprazole [PriLOSEC] 20 mg PO AC-BRKFST PRN 10/05/14 [History]


metFORMIN HCL [Glucophage] 1,000 mg PO AC-BID 10/05/14 [History]


Amoxic-Pot Clav 875-125Mg [Augmentin 875-125] 1 tab PO Q12HR 06/18/18 [History]


Atorvastatin [Lipitor] 40 mg PO HS 06/18/18 [History]


Bisacodyl [Dulcolax] 10 mg RECTAL DAILY PRN 06/18/18 [History]


Budesonide/Formoterol Fumarate [Symbicort 160-4.5 Mcg Inhaler] 2 puff 

INHALATION BID 06/18/18 [History]


Fluticasone/Vilanterol [Breo Ellipta 100-25 Mcg Inhaler] 1 inhalation PO Q24HR 

06/18/18 [History]


Hydrochlorothiazide [Hydrodiuril] 12.5 mg PO DAILY 06/18/18 [History]


Insulin Aspart [NovoLOG (formulary)] See Protocol SQ AC-TID 06/18/18 [History]


Insulin Detemir [Levemir] 15 unit SQ DAILY 06/18/18 [History]


Ipratropium Nebulized [Atrovent Nebulized] 0.5 mg INHALATION RT-TID 06/18/18 [

History]


Lactulose 20 gm PO BID PRN 06/18/18 [History]


Magnesium Hydroxide [Milk of Magnesia Concentrate] 7,200 mg PO AS DIRECTED PRN 

06/18/18 [History]


Megestrol Acetate [Megace] 200 mg PO TID 06/18/18 [History]


Methocarbamol [Robaxin] 500 mg PO Q8H PRN 06/18/18 [History]


Metoclopramide HCl [Reglan] 10 mg PO QID 06/18/18 [History]


Metoprolol Tartrate [Lopressor] 25 mg PO BID 06/18/18 [History]


Montelukast [Singulair] 10 mg PO DAILY 06/18/18 [History]


Na Phos,M-B/Na Phos,Di-Ba [Fleet Adult] 133 ml RECTAL ONCE PRN 06/18/18 [History

]


Vits A and D/White Pet/Lanolin [A and D Ointment] 1 applic TOPICAL DAILY PRN 06/ 18/18 [History]


oxyCODONE HCL [oxyCODONE HCL ER] 40 mg PO Q12HR #60 tab.er.12h 06/21/18 [Rx]


oxyCODONE-APAP 5-325MG [Percocet 5-325 mg] 2 tab PO Q4HR PRN #240 tab 06/21/18 [

Rx]








Discharge Disposition: TRANSFER TO SNF/ECF

## 2018-07-20 ENCOUNTER — HOSPITAL ENCOUNTER (OUTPATIENT)
Dept: HOSPITAL 47 - RADCTMAIN | Age: 68
Discharge: HOME | End: 2018-07-20
Attending: NEUROLOGICAL SURGERY
Payer: MEDICARE

## 2018-07-20 DIAGNOSIS — M43.12: ICD-10-CM

## 2018-07-20 DIAGNOSIS — M99.71: Primary | ICD-10-CM

## 2018-07-20 DIAGNOSIS — Z98.1: ICD-10-CM

## 2018-07-20 DIAGNOSIS — M43.22: ICD-10-CM

## 2018-07-20 PROCEDURE — 72125 CT NECK SPINE W/O DYE: CPT

## 2018-07-23 NOTE — CT
EXAMINATION TYPE: CT cervical spine wo con

 

DATE OF EXAM: 7/20/2018

 

COMPARISON:

 

HISTORY: Patient poor historian.  Radiculopathy.

 

CT DLP: 509.7 mGycm

 

CONTRAST: None

 

CT of the cervical spine is performed in the axial plane at 2 mm thick sections.  Reconstructed image
s in the coronal, and sagittal plane are reviewed on the computer. 

 

No acute fractures are evident.

 

There is an anterior listhesis of C6 on C7. Anterior cervical fusion is present C6-7. There is disc s
pacers C3-4 C4-5. Fusion appears to be present however throughout the C3-C6 level.

 

The prevertebral space appears normal. There is some AP narrowing posterior to the C3-4 space measuri
ng 1.1 cm in the sagittal plane. No spinal canal stenosis is evident.

 

Mild right foraminal narrowing is present C3-4. Mild right foraminal narrowing is present C4-5. There
 is moderate left foraminal narrowing C5-6.

 

Facet degenerative changes are noted. No extension through the upper thoracic spine appears unremarka
ble without stenosis. Note is made of mild disc space narrowing. Minimal superior endplate deformity 
of T3 may be present. No suspicious changes to suggest acute compression is evident however.

 

IMPRESSIONS:

1.   Postsurgical cervical fusion.

2. Grade 1 spondylolisthesis of C6 anterior and C7 with an anterior cervical fusion through this leve
l.

3. Mild appearing foraminal stenosis due to facet hypertrophy and uncovertebral joint hypertrophy dis
cussed above

## 2019-01-02 ENCOUNTER — HOSPITAL ENCOUNTER (OUTPATIENT)
Dept: HOSPITAL 47 - RADCTMAIN | Age: 69
Discharge: HOME | End: 2019-01-02
Attending: NEUROLOGICAL SURGERY
Payer: MEDICARE

## 2019-01-02 DIAGNOSIS — M48.02: Primary | ICD-10-CM

## 2019-01-02 DIAGNOSIS — Z98.1: ICD-10-CM

## 2019-01-02 DIAGNOSIS — M46.92: ICD-10-CM

## 2019-01-02 PROCEDURE — 72125 CT NECK SPINE W/O DYE: CPT

## 2019-01-02 NOTE — CT
EXAMINATION TYPE: CT cervical spine wo con

 

DATE OF EXAM: 1/2/2019

 

COMPARISON: 7/20/2018

 

HISTORY: Spondylosis with myelopathy cervical spine

 

CT DLP: 644.7 mGycm.  Automated Exposure Control for Dose Reduction was Utilized.

 

TECHNIQUE:  CT scan of the cervical spine is obtained without contrast, axial images are obtained, sa
gittal and coronal reformatted images are also reviewed.

 

FINDINGS: There is redemonstration of surgical fusion of C6-C7. There is grade 1 anterolisthesis of C
5 on C6 and C6 on C7, unchanged from the prior. There remains postsurgical changes of the upper cervi
simin spine is well with near complete osseous fusion. Presumably degenerative narrowing of the atlanto
dental interval is noted. Skull base is aligned. No acute fracture is seen of the cervical spine. The
re is osseous demineralization present. Schmorl's node of the superior endplate of the upper thoracic
 spine T3 vertebra vertebrae is again seen.

 

No prevertebral soft tissue swelling is noted. Odontoid appears intact. Upper lungs appear well aerat
ed. Thyroid gland is heterogenous with multiple subcentimeter nodules.

 

No significant neural foraminal narrowing or spinal canal stenosis at C2-C3.

 

Mild right neural foraminal narrowing is again seen at C3-C4 as result of facet arthropathy. Spinal c
anal and left neural foramen are patent.

 

Mild right neural foraminal narrowing is also redemonstrated at C4-C5 as result of facet arthropathy.
 The spinal canal and left neural foramen are patent.

 

Moderate left neural foraminal narrowing at C5-C6 is stable as a result of facet arthropathy and unco
vertebral hypertrophy. No right neural foraminal narrowing or spinal canal stenosis.

 

At the C6-C7 and C7-T1 vertebral levels there is calcification of the posterior longitudinal ligament
 however there is no spinal canal stenosis nor neural foraminal narrowing appreciated.

 

IMPRESSION:

1. Stable exam in comparison to the prior of 7/20/2018 with postsurgical changes of the cervical spin
e as described above resulting in osseous fusion of the upper cervical spine. Malalignment of C5 on C
6 and C6 on C7 are also unchanged.

2. Multilevel neural foraminal narrowing stable from the prior as described above secondary to facet 
arthropathy and uncovertebral hypertrophy.